# Patient Record
Sex: FEMALE | Race: WHITE | NOT HISPANIC OR LATINO | URBAN - METROPOLITAN AREA
[De-identification: names, ages, dates, MRNs, and addresses within clinical notes are randomized per-mention and may not be internally consistent; named-entity substitution may affect disease eponyms.]

---

## 2023-07-27 ENCOUNTER — APPOINTMENT (RX ONLY)
Dept: URBAN - METROPOLITAN AREA CLINIC 352 | Facility: CLINIC | Age: 58
Setting detail: DERMATOLOGY
End: 2023-07-27

## 2023-07-27 DIAGNOSIS — Z41.9 ENCOUNTER FOR PROCEDURE FOR PURPOSES OTHER THAN REMEDYING HEALTH STATE, UNSPECIFIED: ICD-10-CM

## 2023-07-27 DIAGNOSIS — L98.8 OTHER SPECIFIED DISORDERS OF THE SKIN AND SUBCUTANEOUS TISSUE: ICD-10-CM

## 2023-07-27 PROCEDURE — ? COSMETIC CONSULTATION: FILLERS

## 2023-07-27 PROCEDURE — ? RECOMMENDATIONS

## 2023-07-27 PROCEDURE — ? COSMETIC QUOTE

## 2023-07-27 PROCEDURE — ? COSMETIC CONSULTATION: BOTULINUM TOXIN

## 2023-07-27 PROCEDURE — ? COSMETIC CONSULTATION: HYDRAFACIAL

## 2023-07-27 PROCEDURE — ? BOTOX

## 2023-07-27 PROCEDURE — ? COSMETIC CONSULTATION - MICRO-NEEDLING

## 2023-07-27 PROCEDURE — ? COSMETIC CONSULTATION: PRODUCTS

## 2023-07-27 ASSESSMENT — LOCATION ZONE DERM: LOCATION ZONE: FACE

## 2023-07-27 ASSESSMENT — LOCATION DETAILED DESCRIPTION DERM
LOCATION DETAILED: RIGHT SUPERIOR CENTRAL MALAR CHEEK
LOCATION DETAILED: RIGHT INFERIOR TEMPLE
LOCATION DETAILED: LEFT CENTRAL BUCCAL CHEEK
LOCATION DETAILED: LEFT SUPERIOR LATERAL MALAR CHEEK
LOCATION DETAILED: LEFT MID TEMPLE
LOCATION DETAILED: RIGHT MEDIAL BUCCAL CHEEK

## 2023-07-27 ASSESSMENT — LOCATION SIMPLE DESCRIPTION DERM
LOCATION SIMPLE: LEFT CHEEK
LOCATION SIMPLE: RIGHT CHEEK
LOCATION SIMPLE: RIGHT TEMPLE
LOCATION SIMPLE: LEFT TEMPLE

## 2023-07-27 NOTE — PROCEDURE: RECOMMENDATIONS
Recommendations (Free Text): We recommend the patient to join All? for Aug 16 [facility] national day
Detail Level: Detailed
Render Risk Assessment In Note?: no

## 2023-07-27 NOTE — PROCEDURE: COSMETIC QUOTE
Breast Procedure 5 Price/Unit (In Dollars- Use Only Numbers And Decimals): 0.00
Implant 9 Units: 0
Body Procedure 2: Microneedling
Face Procedure 6: Teen Hydrafacial
Face Procedure 3: VI Peel Series
Face Procedure 7 Price/Unit (In Dollars- Use Only Numbers And Decimals): 249.00
Face Procedure 4 Price/Unit (In Dollars- Use Only Numbers And Decimals): 175.00
Send Charges To Patient Encounter: No
Face Procedure 1: Express Hydrafacial
Face Procedure 8 Price/Unit (In Dollars- Use Only Numbers And Decimals): 789.00
Body Procedure 1 Price/Unit (In Dollars- Use Only Numbers And Decimals): 500.00
Face Procedure 5 Price/Unit (In Dollars- Use Only Numbers And Decimals): 299.00
Number Of Months: 1
Face Procedure 2 Price/Unit (In Dollars- Use Only Numbers And Decimals): 379.00
Face Procedure 6 Price/Unit (In Dollars- Use Only Numbers And Decimals): 75.00
Face Procedure 3 Price/Unit (In Dollars- Use Only Numbers And Decimals): 419.00
Face Procedure 7: Perfect 10 Chemical Peel
Detail Level: Zone
Include Sales Tax On Surgeon's Fees: Yes
Face Procedure 4: Clinical Facial
Face Procedure 1 Price/Unit (In Dollars- Use Only Numbers And Decimals): 229.00
Face Procedure 8: Microneedling with PRP
Face Procedure 5: Deluxe Hydrafacial
Body Procedure 1: Chemical Peel Body
Face Procedure 8 Units: 3
Face Procedure 8 Percentage Discount: 10

## 2023-07-27 NOTE — PROCEDURE: BOTOX
Price (Use Numbers Only, No Special Characters Or $): 840 Price (Use Numbers Only, No Special Characters Or $): 993

## 2023-08-29 ENCOUNTER — APPOINTMENT (RX ONLY)
Dept: URBAN - METROPOLITAN AREA CLINIC 352 | Facility: CLINIC | Age: 58
Setting detail: DERMATOLOGY
End: 2023-08-29

## 2023-08-29 DIAGNOSIS — Z41.9 ENCOUNTER FOR PROCEDURE FOR PURPOSES OTHER THAN REMEDYING HEALTH STATE, UNSPECIFIED: ICD-10-CM

## 2023-08-29 PROCEDURE — ? ADDITIONAL NOTES

## 2023-08-29 PROCEDURE — ? JUVEDERM VOLUMA XC INJECTION

## 2023-08-29 PROCEDURE — ? JUVEDERM ULTRA XC INJECTION

## 2023-08-29 ASSESSMENT — LOCATION DETAILED DESCRIPTION DERM
LOCATION DETAILED: RIGHT INFERIOR VERMILION LIP
LOCATION DETAILED: RIGHT CENTRAL MALAR CHEEK
LOCATION DETAILED: RIGHT MEDIAL MALAR CHEEK
LOCATION DETAILED: LEFT SUPERIOR CENTRAL MALAR CHEEK
LOCATION DETAILED: LEFT MEDIAL MALAR CHEEK
LOCATION DETAILED: LEFT SUPERIOR VERMILION LIP

## 2023-08-29 ASSESSMENT — LOCATION SIMPLE DESCRIPTION DERM
LOCATION SIMPLE: RIGHT LIP
LOCATION SIMPLE: LEFT CHEEK
LOCATION SIMPLE: RIGHT CHEEK
LOCATION SIMPLE: LEFT LIP

## 2023-08-29 ASSESSMENT — LOCATION ZONE DERM
LOCATION ZONE: FACE
LOCATION ZONE: LIP

## 2023-08-29 NOTE — PROCEDURE: ADDITIONAL NOTES
Detail Level: Detailed
Render Risk Assessment In Note?: yes
Additional Notes: Stephenerm August Prom[employer] buy 3 syringes get 1 free (supplied by rep)was applied

## 2023-08-29 NOTE — PROCEDURE: JUVEDERM VOLUMA XC INJECTION
Price (Use Numbers Only, No Special Characters Or $): 5527 Price (Use Numbers Only, No Special Characters Or $): 5452

## 2023-12-08 ENCOUNTER — APPOINTMENT (RX ONLY)
Dept: URBAN - METROPOLITAN AREA CLINIC 352 | Facility: CLINIC | Age: 58
Setting detail: DERMATOLOGY
End: 2023-12-08

## 2023-12-08 DIAGNOSIS — D18.0 HEMANGIOMA: ICD-10-CM | Status: STABLE

## 2023-12-08 DIAGNOSIS — L82.1 OTHER SEBORRHEIC KERATOSIS: ICD-10-CM | Status: STABLE

## 2023-12-08 DIAGNOSIS — D22 MELANOCYTIC NEVI: ICD-10-CM | Status: STABLE

## 2023-12-08 DIAGNOSIS — Z71.89 OTHER SPECIFIED COUNSELING: ICD-10-CM

## 2023-12-08 DIAGNOSIS — L81.4 OTHER MELANIN HYPERPIGMENTATION: ICD-10-CM | Status: STABLE

## 2023-12-08 DIAGNOSIS — Z41.9 ENCOUNTER FOR PROCEDURE FOR PURPOSES OTHER THAN REMEDYING HEALTH STATE, UNSPECIFIED: ICD-10-CM

## 2023-12-08 PROBLEM — D18.01 HEMANGIOMA OF SKIN AND SUBCUTANEOUS TISSUE: Status: ACTIVE | Noted: 2023-12-08

## 2023-12-08 PROBLEM — D22.5 MELANOCYTIC NEVI OF TRUNK: Status: ACTIVE | Noted: 2023-12-08

## 2023-12-08 PROCEDURE — ? TREATMENT REGIMEN

## 2023-12-08 PROCEDURE — 99213 OFFICE O/P EST LOW 20 MIN: CPT

## 2023-12-08 PROCEDURE — ? FULL BODY SKIN EXAM

## 2023-12-08 PROCEDURE — ? COSMETIC CONSULTATION: FILLERS

## 2023-12-08 PROCEDURE — ? COSMETIC FOLLOW-UP

## 2023-12-08 PROCEDURE — ? HYALURONIDASE INJECTION

## 2023-12-08 PROCEDURE — ? COUNSELING

## 2023-12-08 PROCEDURE — ? RECOMMENDATIONS

## 2023-12-08 ASSESSMENT — LOCATION ZONE DERM
LOCATION ZONE: FACE
LOCATION ZONE: TRUNK

## 2023-12-08 ASSESSMENT — LOCATION SIMPLE DESCRIPTION DERM
LOCATION SIMPLE: UPPER BACK
LOCATION SIMPLE: ABDOMEN
LOCATION SIMPLE: LOWER BACK
LOCATION SIMPLE: RIGHT CHEEK
LOCATION SIMPLE: RIGHT UPPER BACK

## 2023-12-08 ASSESSMENT — LOCATION DETAILED DESCRIPTION DERM
LOCATION DETAILED: INFERIOR THORACIC SPINE
LOCATION DETAILED: RIGHT INFERIOR CENTRAL MALAR CHEEK
LOCATION DETAILED: RIGHT MEDIAL UPPER BACK
LOCATION DETAILED: SUPERIOR LUMBAR SPINE
LOCATION DETAILED: EPIGASTRIC SKIN

## 2023-12-08 NOTE — HPI: COSMETIC FOLLOW UP
How Did You Tolerate The Procedure?: well, without problems
What Condition Are We Treating?: Potential fallen filler causing unevenness
What Procedure Did We Perform At The Last Visit?: Hyaluronidase injections

## 2023-12-08 NOTE — PROCEDURE: HYALURONIDASE INJECTION
Total Volume (Ccs): 0.2
Expiration Date (Optional): 11/2024
Administered By (Optional): Gaby Gould
Lot # (Optional): [id number]
Consent: The risks of contour defects and dimpling of the skin were reviewed with the patient prior to the injection.
Hyaluronidase Preparation: hyaluronidase
Detail Level: Detailed
Treatment Number (Optional): 1

## 2023-12-08 NOTE — PROCEDURE: COSMETIC FOLLOW-UP
Detail Level: Zone
Treatment Override (Free Text): Patient received hyaluronidase injection to help dissolve  unwanted area.